# Patient Record
Sex: FEMALE | Race: OTHER | HISPANIC OR LATINO | URBAN - METROPOLITAN AREA
[De-identification: names, ages, dates, MRNs, and addresses within clinical notes are randomized per-mention and may not be internally consistent; named-entity substitution may affect disease eponyms.]

---

## 2023-10-15 ENCOUNTER — INPATIENT (INPATIENT)
Facility: HOSPITAL | Age: 28
LOS: 2 days | Discharge: HOME | DRG: 418 | End: 2023-10-18
Attending: SURGERY | Admitting: SURGERY
Payer: COMMERCIAL

## 2023-10-15 VITALS
SYSTOLIC BLOOD PRESSURE: 131 MMHG | RESPIRATION RATE: 18 BRPM | TEMPERATURE: 98 F | HEART RATE: 68 BPM | OXYGEN SATURATION: 95 % | DIASTOLIC BLOOD PRESSURE: 92 MMHG | WEIGHT: 138.89 LBS

## 2023-10-15 PROCEDURE — 93010 ELECTROCARDIOGRAM REPORT: CPT

## 2023-10-15 PROCEDURE — 99285 EMERGENCY DEPT VISIT HI MDM: CPT

## 2023-10-15 PROCEDURE — 76705 ECHO EXAM OF ABDOMEN: CPT | Mod: 26

## 2023-10-15 PROCEDURE — 99221 1ST HOSP IP/OBS SF/LOW 40: CPT

## 2023-10-15 RX ORDER — MORPHINE SULFATE 50 MG/1
4 CAPSULE, EXTENDED RELEASE ORAL ONCE
Refills: 0 | Status: DISCONTINUED | OUTPATIENT
Start: 2023-10-15 | End: 2023-10-15

## 2023-10-15 RX ORDER — SODIUM CHLORIDE 9 MG/ML
1000 INJECTION, SOLUTION INTRAVENOUS
Refills: 0 | Status: DISCONTINUED | OUTPATIENT
Start: 2023-10-15 | End: 2023-10-17

## 2023-10-15 RX ORDER — HEPARIN SODIUM 5000 [USP'U]/ML
5000 INJECTION INTRAVENOUS; SUBCUTANEOUS EVERY 8 HOURS
Refills: 0 | Status: DISCONTINUED | OUTPATIENT
Start: 2023-10-15 | End: 2023-10-18

## 2023-10-15 RX ORDER — SODIUM CHLORIDE 9 MG/ML
2000 INJECTION INTRAMUSCULAR; INTRAVENOUS; SUBCUTANEOUS ONCE
Refills: 0 | Status: COMPLETED | OUTPATIENT
Start: 2023-10-15 | End: 2023-10-15

## 2023-10-15 RX ORDER — OXYCODONE HYDROCHLORIDE 5 MG/1
5 TABLET ORAL EVERY 6 HOURS
Refills: 0 | Status: DISCONTINUED | OUTPATIENT
Start: 2023-10-15 | End: 2023-10-16

## 2023-10-15 RX ORDER — SODIUM CHLORIDE 9 MG/ML
1000 INJECTION INTRAMUSCULAR; INTRAVENOUS; SUBCUTANEOUS ONCE
Refills: 0 | Status: COMPLETED | OUTPATIENT
Start: 2023-10-15 | End: 2023-10-15

## 2023-10-15 RX ORDER — ONDANSETRON 8 MG/1
4 TABLET, FILM COATED ORAL ONCE
Refills: 0 | Status: COMPLETED | OUTPATIENT
Start: 2023-10-15 | End: 2023-10-15

## 2023-10-15 RX ORDER — ACETAMINOPHEN 500 MG
650 TABLET ORAL EVERY 6 HOURS
Refills: 0 | Status: DISCONTINUED | OUTPATIENT
Start: 2023-10-15 | End: 2023-10-18

## 2023-10-15 RX ORDER — HEPARIN SODIUM 5000 [USP'U]/ML
500 INJECTION INTRAVENOUS; SUBCUTANEOUS EVERY 8 HOURS
Refills: 0 | Status: DISCONTINUED | OUTPATIENT
Start: 2023-10-15 | End: 2023-10-15

## 2023-10-15 RX ORDER — ONDANSETRON 8 MG/1
4 TABLET, FILM COATED ORAL EVERY 6 HOURS
Refills: 0 | Status: DISCONTINUED | OUTPATIENT
Start: 2023-10-15 | End: 2023-10-18

## 2023-10-15 RX ADMIN — SODIUM CHLORIDE 2000 MILLILITER(S): 9 INJECTION INTRAMUSCULAR; INTRAVENOUS; SUBCUTANEOUS at 21:13

## 2023-10-15 RX ADMIN — ONDANSETRON 4 MILLIGRAM(S): 8 TABLET, FILM COATED ORAL at 18:42

## 2023-10-15 RX ADMIN — OXYCODONE HYDROCHLORIDE 5 MILLIGRAM(S): 5 TABLET ORAL at 22:41

## 2023-10-15 RX ADMIN — HEPARIN SODIUM 5000 UNIT(S): 5000 INJECTION INTRAVENOUS; SUBCUTANEOUS at 22:49

## 2023-10-15 RX ADMIN — MORPHINE SULFATE 4 MILLIGRAM(S): 50 CAPSULE, EXTENDED RELEASE ORAL at 21:13

## 2023-10-15 RX ADMIN — SODIUM CHLORIDE 140 MILLILITER(S): 9 INJECTION, SOLUTION INTRAVENOUS at 22:42

## 2023-10-15 RX ADMIN — MORPHINE SULFATE 4 MILLIGRAM(S): 50 CAPSULE, EXTENDED RELEASE ORAL at 18:42

## 2023-10-15 RX ADMIN — SODIUM CHLORIDE 1000 MILLILITER(S): 9 INJECTION INTRAMUSCULAR; INTRAVENOUS; SUBCUTANEOUS at 18:42

## 2023-10-15 RX ADMIN — MORPHINE SULFATE 4 MILLIGRAM(S): 50 CAPSULE, EXTENDED RELEASE ORAL at 21:30

## 2023-10-15 RX ADMIN — MORPHINE SULFATE 4 MILLIGRAM(S): 50 CAPSULE, EXTENDED RELEASE ORAL at 20:53

## 2023-10-15 RX ADMIN — OXYCODONE HYDROCHLORIDE 5 MILLIGRAM(S): 5 TABLET ORAL at 23:41

## 2023-10-15 RX ADMIN — SODIUM CHLORIDE 1000 MILLILITER(S): 9 INJECTION INTRAMUSCULAR; INTRAVENOUS; SUBCUTANEOUS at 20:53

## 2023-10-15 NOTE — H&P ADULT - ATTENDING COMMENTS
Patient seen,a josuée with above. Gallstones pancreatitis.  Today she appears well and is stable. + tenderness epigastrium only. ernie CLD. Patient has updated me that she has previously seen Dr. Javed, she will be transferred to him to continue care of pancreatitis and plan lo.

## 2023-10-15 NOTE — H&P ADULT - NSHPLABSRESULTS_GEN_ALL_CORE
CBC Full  -  ( 15 Oct 2023 18:27 )  WBC Count : 13.09 K/uL  RBC Count : 5.15 M/uL  Hemoglobin : 14.8 g/dL  Hematocrit : 43.9 %  Platelet Count - Automated : 206 K/uL  Mean Cell Volume : 85.2 fl  Mean Cell Hemoglobin : 28.7 pg  Mean Cell Hemoglobin Concentration : 33.7 gm/dL  Auto Neutrophil # : 10.94 K/uL  Auto Lymphocyte # : 1.27 K/uL  Auto Monocyte # : 0.67 K/uL  Auto Eosinophil # : 0.15 K/uL  Auto Basophil # : 0.02 K/uL  Auto Neutrophil % : 83.6 %  Auto Lymphocyte % : 9.7 %  Auto Monocyte % : 5.1 %  Auto Eosinophil % : 1.1 %  Auto Basophil % : 0.2 %    10-15    139  |  103  |  8   ----------------------------<  105<H>  4.1   |  26  |  0.51    Ca    10.2      15 Oct 2023 18:27    TPro  8.1  /  Alb  4.5  /  TBili  0.5  /  DBili  x   /  AST  101<H>  /  ALT  88<H>  /  AlkPhos  112  10-15    LIVER FUNCTIONS - ( 15 Oct 2023 18:27 )  Alb: 4.5 g/dL / Pro: 8.1 g/dL / ALK PHOS: 112 U/L / ALT: 88 U/L / AST: 101 U/L / GGT: x             PT/INR - ( 15 Oct 2023 18:27 )   PT: 11.1 sec;   INR: 0.97          PTT - ( 15 Oct 2023 18:27 )  PTT:43.2 sec    Urinalysis Basic - ( 15 Oct 2023 18:27 )    Color: x / Appearance: x / SG: x / pH: x  Gluc: 105 mg/dL / Ketone: x  / Bili: x / Urobili: x   Blood: x / Protein: x / Nitrite: x   Leuk Esterase: x / RBC: x / WBC x   Sq Epi: x / Non Sq Epi: x / Bacteria: x      RADIOLOGY:  ACC: [id number] EXAM:  US ABDOMEN RT UPR QUADRANT   ORDERED BY: JEAN PAUL BLAIR     *** ADDENDUM # 1 ***    IMPRESSION should also include: Mildly dilated common bile duct.   Nonemergent follow-up with MRCP is suggested.    --- End of Report ---    *** END OF ADDENDUM # 1 ***      PROCEDURE DATE:  10/15/2023          INTERPRETATION:  CLINICAL INFORMATION: Abdominal pain. Concern for   cholecystitis.    COMPARISON: None available.    TECHNIQUE: Sonography of the right upper quadrant.    FINDINGS:  Liver: Enlarged with a right lobe length of 20 cm. Normal parenchymal   echogenicity.  Bile ducts: Mildly dilated common bile duct measures 7 mm.  Gallbladder: Distended. Multiple small mobile stones along the dependent   lumen. No wall thickeningor pericholecystic fluid. Positive sonographic   [place] sign.  Pancreas: Visualized portions are within normal limits.  Right kidney: 11.2 cm. No hydronephrosis.  Ascites: None.  Aorta/IVC: Visualized portions are within normal limits.    IMPRESSION:  Distended gallbladder containing multiple small stones and positive   sonographic [place] sign. Findings are suspicious for acute cholecystitis.

## 2023-10-15 NOTE — ED PROVIDER NOTE - PHYSICAL EXAMINATION
abd: BS+, soft, minimal RUQ ttp, no rebound/guarding. no CVAT  no LE edema, normal equal distal pulses, steady unassisted gait.

## 2023-10-15 NOTE — H&P ADULT - NSHPPHYSICALEXAM_GEN_ALL_CORE
General: Resting in bed, NAD  Neuro: A&Ox3, no focal deficits  CV: NSR  Pulm: Equal chest wall expansion b/l, no respiratory distress  Abdomen: Soft, nondistended, tender to palpation in RUQ and epigastrium. Negative [place]'s signs. No rebound, no guarding. No CVA tenderness. No prior incision noted  Extremities: WWP, No peripheral edema

## 2023-10-15 NOTE — H&P ADULT - ASSESSMENT
28F with no significant PMHx and PSHx of L arm surgery as a child who presents to [facility] from home for worsening RUQ abdominal pain. Patient afebrile and HD stable on presentation. Laboratory findings significant for leukocytosis with L shift and mild transaminitis with elvated lipase c/f pancreatitis. RUQ U/S significant for distended gallbladder with positive sonographic [place]'s sign. Given elevated lipase and gallstones on U/S, likely diagnosis gallstone pancreatitis.     Admit to surgery, regional under    CLD  IVF @ 120  No antbiotics  Pain/nausea control PRN  HSQ/SCDs/IS/OOBA  AM labs with Hepatic functional panel daily  Likely laparoscopic cholecystectomy this admisson  Discussed with chief on call and attending

## 2023-10-15 NOTE — ED PROVIDER NOTE - OBJECTIVE STATEMENT
28F PMH cholelithiasis p/w abd pain. Has intermittent RUQ pain for several months, was diagnosed w/ cholelithiasis during pregnancy, had subsequently followed up w/ Dr. Javed (last visit ~1.5w ago) and had scheduled cholecystectomy for 11/3. However has recurrent similar worsening RUQ pain since ~1100 associated w/ NBNB NV so she came to ED. No other systemic symptoms.   Took tylenol w/o relief.   Denies fevers, chills, diarrhea, black stool, bloody stool, dysuria, hematuria, urinary frequency, focal weakness, focal numbness, lightheadedness, back pain, SOB, CP, rhinorrhea, nasal congestion, sore throat, cough.

## 2023-10-15 NOTE — ED PROVIDER NOTE - PROGRESS NOTE DETAILS
[place]: WBC 13.09, , ALT 88, lipase >3000, other labs grossly wnl. US pending. Surgery consulted. Still in pain but improving. Will give additional IVF/pain meds.  Pending: US, surgery dispo. sandie - received on sign out. workup thus far as above.    US "IMPRESSION:  Distended gallbladder containing multiple small stones and positive   sonographic [place] sign. Findings are suspicious for acute cholecystitis.  IMPRESSION should also include: Mildly dilated common bile duct.   Nonemergent follow-up with MRCP is suggested."    imaging as above. discussed w surg. admit for further management. pt aware and agreeable. abd exam benign at time of admission.

## 2023-10-15 NOTE — ED PROVIDER NOTE - CLINICAL SUMMARY MEDICAL DECISION MAKING FREE TEXT BOX
28F PMH cholelithiasis p/w abd pain. Has intermittent RUQ pain for several months, was diagnosed w/ cholelithiasis during pregnancy, had subsequently followed up w/ Dr. Javed (last visit ~1.5w ago) and had scheduled cholecystectomy for 11/3. However has recurrent similar worsening RUQ pain since ~1100 associated w/ NBNB NV so she came to ED. No other systemic symptoms.   Vitals wnl, exam as above.  ddx: Likely cholelithiasis vs. cholecystitis.   Labs, US, IVF/attempt symptom control, surgery consult (pt already following w/ Dr. Duran Javed).

## 2023-10-15 NOTE — ED ADULT NURSE REASSESSMENT NOTE - NS ED NURSE REASSESS COMMENT FT1
Received report from JANEEN Shelley. Received patient in stretcher. AOX4. Vital signs as noted in flowsheet.  Patient denies chest pain, pain, discomfort, shortness of breath, difficulty breathing and any form of distress not noted. Patient oriented to ED area. Plan of care discussed and verbalized understanding. All needs attended. Purposeful proactive hourly rounding in progress.

## 2023-10-16 LAB
ALBUMIN SERPL ELPH-MCNC: 3.5 G/DL — SIGNIFICANT CHANGE UP (ref 3.3–5)
ALBUMIN SERPL ELPH-MCNC: 3.5 G/DL — SIGNIFICANT CHANGE UP (ref 3.3–5)
ALP SERPL-CCNC: 79 U/L — SIGNIFICANT CHANGE UP (ref 40–120)
ALP SERPL-CCNC: 79 U/L — SIGNIFICANT CHANGE UP (ref 40–120)
ALT FLD-CCNC: 54 U/L — HIGH (ref 10–45)
ALT FLD-CCNC: 54 U/L — HIGH (ref 10–45)
ANION GAP SERPL CALC-SCNC: 9 MMOL/L — SIGNIFICANT CHANGE UP (ref 5–17)
ANION GAP SERPL CALC-SCNC: 9 MMOL/L — SIGNIFICANT CHANGE UP (ref 5–17)
AST SERPL-CCNC: 30 U/L — SIGNIFICANT CHANGE UP (ref 10–40)
AST SERPL-CCNC: 30 U/L — SIGNIFICANT CHANGE UP (ref 10–40)
BILIRUB SERPL-MCNC: 0.4 MG/DL — SIGNIFICANT CHANGE UP (ref 0.2–1.2)
BILIRUB SERPL-MCNC: 0.4 MG/DL — SIGNIFICANT CHANGE UP (ref 0.2–1.2)
BUN SERPL-MCNC: 6 MG/DL — LOW (ref 7–23)
BUN SERPL-MCNC: 6 MG/DL — LOW (ref 7–23)
CALCIUM SERPL-MCNC: 8.9 MG/DL — SIGNIFICANT CHANGE UP (ref 8.4–10.5)
CALCIUM SERPL-MCNC: 8.9 MG/DL — SIGNIFICANT CHANGE UP (ref 8.4–10.5)
CHLORIDE SERPL-SCNC: 106 MMOL/L — SIGNIFICANT CHANGE UP (ref 96–108)
CHLORIDE SERPL-SCNC: 106 MMOL/L — SIGNIFICANT CHANGE UP (ref 96–108)
CO2 SERPL-SCNC: 26 MMOL/L — SIGNIFICANT CHANGE UP (ref 22–31)
CO2 SERPL-SCNC: 26 MMOL/L — SIGNIFICANT CHANGE UP (ref 22–31)
CREAT SERPL-MCNC: 0.57 MG/DL — SIGNIFICANT CHANGE UP (ref 0.5–1.3)
CREAT SERPL-MCNC: 0.57 MG/DL — SIGNIFICANT CHANGE UP (ref 0.5–1.3)
EGFR: 128 ML/MIN/1.73M2 — SIGNIFICANT CHANGE UP
EGFR: 128 ML/MIN/1.73M2 — SIGNIFICANT CHANGE UP
GLUCOSE SERPL-MCNC: 79 MG/DL — SIGNIFICANT CHANGE UP (ref 70–99)
GLUCOSE SERPL-MCNC: 79 MG/DL — SIGNIFICANT CHANGE UP (ref 70–99)
HCT VFR BLD CALC: 37.2 % — SIGNIFICANT CHANGE UP (ref 34.5–45)
HCT VFR BLD CALC: 37.2 % — SIGNIFICANT CHANGE UP (ref 34.5–45)
HGB BLD-MCNC: 12.4 G/DL — SIGNIFICANT CHANGE UP (ref 11.5–15.5)
HGB BLD-MCNC: 12.4 G/DL — SIGNIFICANT CHANGE UP (ref 11.5–15.5)
MAGNESIUM SERPL-MCNC: 1.7 MG/DL — SIGNIFICANT CHANGE UP (ref 1.6–2.6)
MAGNESIUM SERPL-MCNC: 1.7 MG/DL — SIGNIFICANT CHANGE UP (ref 1.6–2.6)
MCHC RBC-ENTMCNC: 28.9 PG — SIGNIFICANT CHANGE UP (ref 27–34)
MCHC RBC-ENTMCNC: 28.9 PG — SIGNIFICANT CHANGE UP (ref 27–34)
MCHC RBC-ENTMCNC: 33.3 GM/DL — SIGNIFICANT CHANGE UP (ref 32–36)
MCHC RBC-ENTMCNC: 33.3 GM/DL — SIGNIFICANT CHANGE UP (ref 32–36)
MCV RBC AUTO: 86.7 FL — SIGNIFICANT CHANGE UP (ref 80–100)
MCV RBC AUTO: 86.7 FL — SIGNIFICANT CHANGE UP (ref 80–100)
NRBC # BLD: 0 /100 WBCS — SIGNIFICANT CHANGE UP (ref 0–0)
NRBC # BLD: 0 /100 WBCS — SIGNIFICANT CHANGE UP (ref 0–0)
PHOSPHATE SERPL-MCNC: 4.6 MG/DL — HIGH (ref 2.5–4.5)
PHOSPHATE SERPL-MCNC: 4.6 MG/DL — HIGH (ref 2.5–4.5)
PLATELET # BLD AUTO: 162 K/UL — SIGNIFICANT CHANGE UP (ref 150–400)
PLATELET # BLD AUTO: 162 K/UL — SIGNIFICANT CHANGE UP (ref 150–400)
POTASSIUM SERPL-MCNC: 3.7 MMOL/L — SIGNIFICANT CHANGE UP (ref 3.5–5.3)
POTASSIUM SERPL-MCNC: 3.7 MMOL/L — SIGNIFICANT CHANGE UP (ref 3.5–5.3)
POTASSIUM SERPL-SCNC: 3.7 MMOL/L — SIGNIFICANT CHANGE UP (ref 3.5–5.3)
POTASSIUM SERPL-SCNC: 3.7 MMOL/L — SIGNIFICANT CHANGE UP (ref 3.5–5.3)
PROT SERPL-MCNC: 6.4 G/DL — SIGNIFICANT CHANGE UP (ref 6–8.3)
PROT SERPL-MCNC: 6.4 G/DL — SIGNIFICANT CHANGE UP (ref 6–8.3)
RBC # BLD: 4.29 M/UL — SIGNIFICANT CHANGE UP (ref 3.8–5.2)
RBC # BLD: 4.29 M/UL — SIGNIFICANT CHANGE UP (ref 3.8–5.2)
RBC # FLD: 13.4 % — SIGNIFICANT CHANGE UP (ref 10.3–14.5)
RBC # FLD: 13.4 % — SIGNIFICANT CHANGE UP (ref 10.3–14.5)
SODIUM SERPL-SCNC: 141 MMOL/L — SIGNIFICANT CHANGE UP (ref 135–145)
SODIUM SERPL-SCNC: 141 MMOL/L — SIGNIFICANT CHANGE UP (ref 135–145)
WBC # BLD: 5.52 K/UL — SIGNIFICANT CHANGE UP (ref 3.8–10.5)
WBC # BLD: 5.52 K/UL — SIGNIFICANT CHANGE UP (ref 3.8–10.5)
WBC # FLD AUTO: 5.52 K/UL — SIGNIFICANT CHANGE UP (ref 3.8–10.5)
WBC # FLD AUTO: 5.52 K/UL — SIGNIFICANT CHANGE UP (ref 3.8–10.5)

## 2023-10-16 PROCEDURE — 99222 1ST HOSP IP/OBS MODERATE 55: CPT

## 2023-10-16 PROCEDURE — 74181 MRI ABDOMEN W/O CONTRAST: CPT | Mod: 26

## 2023-10-16 RX ORDER — METRONIDAZOLE 500 MG
500 TABLET ORAL ONCE
Refills: 0 | Status: COMPLETED | OUTPATIENT
Start: 2023-10-16 | End: 2023-10-16

## 2023-10-16 RX ORDER — METRONIDAZOLE 500 MG
500 TABLET ORAL EVERY 8 HOURS
Refills: 0 | Status: DISCONTINUED | OUTPATIENT
Start: 2023-10-16 | End: 2023-10-18

## 2023-10-16 RX ORDER — POTASSIUM CHLORIDE 20 MEQ
40 PACKET (EA) ORAL ONCE
Refills: 0 | Status: COMPLETED | OUTPATIENT
Start: 2023-10-16 | End: 2023-10-16

## 2023-10-16 RX ORDER — CEFTRIAXONE 500 MG/1
1000 INJECTION, POWDER, FOR SOLUTION INTRAMUSCULAR; INTRAVENOUS EVERY 24 HOURS
Refills: 0 | Status: DISCONTINUED | OUTPATIENT
Start: 2023-10-16 | End: 2023-10-18

## 2023-10-16 RX ORDER — MAGNESIUM SULFATE 500 MG/ML
1 VIAL (ML) INJECTION ONCE
Refills: 0 | Status: COMPLETED | OUTPATIENT
Start: 2023-10-16 | End: 2023-10-16

## 2023-10-16 RX ORDER — OXYCODONE HYDROCHLORIDE 5 MG/1
5 TABLET ORAL EVERY 6 HOURS
Refills: 0 | Status: DISCONTINUED | OUTPATIENT
Start: 2023-10-16 | End: 2023-10-18

## 2023-10-16 RX ORDER — INFLUENZA VIRUS VACCINE 15; 15; 15; 15 UG/.5ML; UG/.5ML; UG/.5ML; UG/.5ML
0.5 SUSPENSION INTRAMUSCULAR ONCE
Refills: 0 | Status: DISCONTINUED | OUTPATIENT
Start: 2023-10-16 | End: 2023-10-18

## 2023-10-16 RX ORDER — METRONIDAZOLE 500 MG
TABLET ORAL
Refills: 0 | Status: DISCONTINUED | OUTPATIENT
Start: 2023-10-16 | End: 2023-10-18

## 2023-10-16 RX ADMIN — Medication 650 MILLIGRAM(S): at 14:55

## 2023-10-16 RX ADMIN — CEFTRIAXONE 100 MILLIGRAM(S): 500 INJECTION, POWDER, FOR SOLUTION INTRAMUSCULAR; INTRAVENOUS at 16:59

## 2023-10-16 RX ADMIN — OXYCODONE HYDROCHLORIDE 5 MILLIGRAM(S): 5 TABLET ORAL at 17:02

## 2023-10-16 RX ADMIN — OXYCODONE HYDROCHLORIDE 5 MILLIGRAM(S): 5 TABLET ORAL at 04:25

## 2023-10-16 RX ADMIN — OXYCODONE HYDROCHLORIDE 5 MILLIGRAM(S): 5 TABLET ORAL at 05:25

## 2023-10-16 RX ADMIN — Medication 100 MILLIGRAM(S): at 17:40

## 2023-10-16 RX ADMIN — Medication 650 MILLIGRAM(S): at 13:55

## 2023-10-16 RX ADMIN — HEPARIN SODIUM 5000 UNIT(S): 5000 INJECTION INTRAVENOUS; SUBCUTANEOUS at 05:57

## 2023-10-16 RX ADMIN — Medication 40 MILLIEQUIVALENT(S): at 18:50

## 2023-10-16 RX ADMIN — Medication 100 GRAM(S): at 18:50

## 2023-10-16 RX ADMIN — HEPARIN SODIUM 5000 UNIT(S): 5000 INJECTION INTRAVENOUS; SUBCUTANEOUS at 13:55

## 2023-10-16 RX ADMIN — SODIUM CHLORIDE 140 MILLILITER(S): 9 INJECTION, SOLUTION INTRAVENOUS at 13:55

## 2023-10-16 NOTE — CONSULT NOTE ADULT - ASSESSMENT
29 yo F with no significant PMHx and PSHx of L arm surgery as a child who presents to [facility] from home for  RUQ abdominal pain, pt was scheduled for elective procedure in November but presented to ed for  worsening RUQ abdominal pain, .  Pt admitted for gallstone pancreatitis and acute cholecystitis     gallstone pancreatitis  acute cholecystitis   transaminitis   RUQ  W Distended gallbladder containing multiple small stones and positive   sonographic [place] sign. Findings are suspicious for acute cholecystitis.  Lipase > 3k   wbc  >13k   agree w IVF and CLD   lft'S downtrending AST/ALD  101/88  plan for CCY on this admission after recovery of pancrease     dvt ppx hsq

## 2023-10-16 NOTE — PROGRESS NOTE ADULT - SUBJECTIVE AND OBJECTIVE BOX
SUBJECTIVE: Patient seen and examined bedside. Had 4 episodes of vomiting this morning but feeling better now. No flatus/BM.    heparin   Injectable 5000 Unit(s) SubCutaneous every 8 hours      Vital Signs Last 24 Hrs  T(C): 36.8 (16 Oct 2023 04:15), Max: 36.8 (15 Oct 2023 22:35)  T(F): 98.3 (16 Oct 2023 04:15), Max: 98.3 (15 Oct 2023 22:35)  HR: 66 (16 Oct 2023 04:15) (65 - 77)  BP: 117/73 (16 Oct 2023 04:15) (109/72 - 143/90)  BP(mean): --  RR: 17 (16 Oct 2023 04:15) (17 - 18)  SpO2: 96% (16 Oct 2023 04:15) (95% - 98%)    Parameters below as of 16 Oct 2023 04:15  Patient On (Oxygen Delivery Method): room air      I&O's Detail    15 Oct 2023 07:01  -  16 Oct 2023 07:00  --------------------------------------------------------  IN:    Lactated Ringers: 1400 mL  Total IN: 1400 mL    OUT:    Voided (mL): 350 mL  Total OUT: 350 mL    Total NET: 1050 mL        General: Resting in bed, NAD  Neuro: A&Ox3, no focal deficits  CV: NSR  Pulm: Equal chest wall expansion b/l, no respiratory distress  Abdomen: Soft, nondistended, tender to palpation in RUQ and epigastrium. Negative [place]'s signs. No rebound, no guarding. No CVA tenderness. No prior incision noted  Extremities: WWP, No peripheral edema        LABS:                        14.8   13.09 )-----------( 206      ( 15 Oct 2023 18:27 )             43.9     10-15    139  |  103  |  8   ----------------------------<  105<H>  4.1   |  26  |  0.51    Ca    10.2      15 Oct 2023 18:27    TPro  8.1  /  Alb  4.5  /  TBili  0.5  /  DBili  x   /  AST  101<H>  /  ALT  88<H>  /  AlkPhos  112  10-15    PT/INR - ( 15 Oct 2023 18:27 )   PT: 11.1 sec;   INR: 0.97          PTT - ( 15 Oct 2023 18:27 )  PTT:43.2 sec  Urinalysis Basic - ( 15 Oct 2023 18:27 )    Color: x / Appearance: x / SG: x / pH: x  Gluc: 105 mg/dL / Ketone: x  / Bili: x / Urobili: x   Blood: x / Protein: x / Nitrite: x   Leuk Esterase: x / RBC: x / WBC x   Sq Epi: x / Non Sq Epi: x / Bacteria: x        RADIOLOGY & ADDITIONAL STUDIES:

## 2023-10-16 NOTE — PATIENT PROFILE ADULT - FALL HARM RISK - UNIVERSAL INTERVENTIONS
Bed in lowest position, wheels locked, appropriate side rails in place/Call bell, personal items and telephone in reach/Instruct patient to call for assistance before getting out of bed or chair/Non-slip footwear when patient is out of bed/[place] to call system/Physically safe environment - no spills, clutter or unnecessary equipment/Purposeful Proactive Rounding/Room/bathroom lighting operational, light cord in reach

## 2023-10-16 NOTE — CONSULT NOTE ADULT - SUBJECTIVE AND OBJECTIVE BOX
Patient is a 28y old  Female who presents with a chief complaint of abdominal pain (16 Oct 2023 07:12)      HPI:  Patient is a 28F with no significant PMHx and PSHx of L arm surgery as a child who presents to [facility] from home for worsening RUQ abdominal pain. Patient reports she has seen Dr. Javed in the past for evaluation for RUQ pain. States she first developed cholelithiasis during pregnancy over the summer where she started to develop biliary colic symptoms of post prandial RUQ pain that was relieved after an hour or so. States episodes at that time were infrequent, but over the past 2 weeks she has been having 1-2 episodes per week of sharp, post prandial RUQ pain that radiates to the back. States the pain is 10/10 at its worst and not relieved by Tylenol. States she developed another attack this AM after a breakfast of an egg sandwich and ice cream at which point she developed sharp, stabby RUQ pain. States pain was associated with nausea and 1 episode of nonbilious, nonbloody emesis. Deneis fevers or chills. States she hasn't been able to tolerate PO today secondary to pain, but is hungry currently.    Denies family history of cholelithiasis or malginancy of galbladder, biliary tree or pancreas.    Medical History: Cholelithiasis  Surgical History: R arm surgery  Medications: Denies  Allergies: NKDA  Social History: Denies tobacco use. Reports she uses alcohol socially. Denies other drug use. States she works as a .    In the ED, patient afebrile:   - VITALS: T 98F, HR 77, /90, saturating well on RA  - LABORATORY: WBC 13K with neutrophil predominence, Lipase >300, mild transaminitis with  and T bili 0.5  - IMAGING: RUQ U/S performed in the ED that revealed distended gallbladder with multiple stones. No PCCF or GBW, but positive sonographic [place]'s sign concerning for acute cholecystitis. CBD mildly dilated at 7 mm (15 Oct 2023 21:44)      Allergies    No Known Allergies    Intolerances        MEDICATIONS  (STANDING):  heparin   Injectable 5000 Unit(s) SubCutaneous every 8 hours  influenza   Vaccine 0.5 milliLiter(s) IntraMuscular once  lactated ringers. 1000 milliLiter(s) (140 mL/Hr) IV Continuous <Continuous>    MEDICATIONS  (PRN):  acetaminophen   Oral Liquid .. 650 milliGRAM(s) Oral every 6 hours PRN Mild Pain (1 - 3), Moderate Pain (4 - 6)  ondansetron Injectable 4 milliGRAM(s) IV Push every 6 hours PRN Nausea  oxyCODONE    IR 5 milliGRAM(s) Oral every 6 hours PRN Severe Pain (7 - 10)      Daily Height in cm: 157.48 (15 Oct 2023 22:08)    Daily     Drug Dosing Weight  Height (cm): 157.5 (15 Oct 2023 22:08)  Weight (kg): 63 (15 Oct 2023 17:27)  BMI (kg/m2): 25.4 (15 Oct 2023 22:08)  BSA (m2): 1.64 (15 Oct 2023 22:08)    PAST MEDICAL & SURGICAL HISTORY:  No pertinent past medical history          FAMILY HISTORY:        REVIEW OF SYSTEMS:    CONSTITUTIONAL: No fever, weight loss, or fatigue  EYES: No eye pain, visual disturbances, or discharge  ENMT:  No difficulty hearing, tinnitus, vertigo; No sinus or throat pain  NECK: No pain or stiffness  RESPIRATORY: No cough, wheezing, chills or hemoptysis; No shortness of breath  CARDIOVASCULAR: No chest pain, palpitations, dizziness, or leg swelling  GASTROINTESTINAL:  + abdominal / epigastric pain. No nausea, vomiting, or hematemesis;    GENITOURINARY: No dysuria, frequency, hematuria, or incontinence  LYMPH NODES: No enlarged glands  ENDOCRINE: No heat or cold intolerance; No hair loss  MUSCULOSKELETAL: No joint pain or swelling; No muscle, back, or extremity pain  NEUROLOGICAL: No headaches, memory loss, loss of strength, numbness, or tremors  SKIN: No itching, burning, rashes, or lesion               Vital Signs Last 24 Hrs  T(C): 36.9 (16 Oct 2023 08:33), Max: 36.9 (16 Oct 2023 08:33)  T(F): 98.5 (16 Oct 2023 08:33), Max: 98.5 (16 Oct 2023 08:33)  HR: 71 (16 Oct 2023 08:33) (65 - 77)  BP: 108/71 (16 Oct 2023 08:33) (108/71 - 143/90)  BP(mean): --  ABP: --  ABP(mean): --  RR: 17 (16 Oct 2023 08:33) (17 - 18)  SpO2: 98% (16 Oct 2023 08:33) (95% - 98%)    O2 Parameters below as of 16 Oct 2023 08:33  Patient On (Oxygen Delivery Method): room air                I&O's Detail    15 Oct 2023 07:01  -  16 Oct 2023 07:00  --------------------------------------------------------  IN:    Lactated Ringers: 1400 mL  Total IN: 1400 mL    OUT:    Voided (mL): 350 mL  Total OUT: 350 mL    Total NET: 1050 mL      16 Oct 2023 07:01  -  16 Oct 2023 12:19  --------------------------------------------------------  IN:    Lactated Ringers: 700 mL    Oral Fluid: 320 mL  Total IN: 1020 mL    OUT:    Voided (mL): 400 mL  Total OUT: 400 mL    Total NET: 620 mL          PHYSICAL EXAM:    GENERAL: NAD, well-groomed, well-developed  HEAD:  Atraumatic, Normocephalic  EYES: EOMI, PERRLA, conjunctiva and sclera clear  ENMT: No tonsillar erythema, exudates, or enlargement; Moist mucous membranes, Good dentition, No lesions  NECK: Supple, No JVD, Normal thyroid  NERVOUS SYSTEM:  Alert & Oriented X3, Good concentration; Motor Strength 5/5 B/L upper and lower extremities; DTRs 2+ intact and symmetric  CHEST/LUNG: Clear to percussion bilaterally; No rales, rhonchi, wheezing, or rubs  HEART: Regular rate and rhythm; No murmurs, rubs, or gallops  ABDOMEN: Soft, ttp in epigastric region , Nondistended; Bowel sounds present  EXTREMITIES:  2+ Peripheral Pulses, No clubbing, cyanosis, or edema  LYMPH: No lymphadenopathy noted  SKIN: No rashes or lesions    LABS:  CBC Full  -  ( 15 Oct 2023 18:27 )  WBC Count : 13.09 K/uL  RBC Count : 5.15 M/uL  Hemoglobin : 14.8 g/dL  Hematocrit : 43.9 %  Platelet Count - Automated : 206 K/uL  Mean Cell Volume : 85.2 fl  Mean Cell Hemoglobin : 28.7 pg  Mean Cell Hemoglobin Concentration : 33.7 gm/dL  Auto Neutrophil # : 10.94 K/uL  Auto Lymphocyte # : 1.27 K/uL  Auto Monocyte # : 0.67 K/uL  Auto Eosinophil # : 0.15 K/uL  Auto Basophil # : 0.02 K/uL  Auto Neutrophil % : 83.6 %  Auto Lymphocyte % : 9.7 %  Auto Monocyte % : 5.1 %  Auto Eosinophil % : 1.1 %  Auto Basophil % : 0.2 %    10-15    139  |  103  |  8   ----------------------------<  105<H>  4.1   |  26  |  0.51    Ca    10.2      15 Oct 2023 18:27    TPro  8.1  /  Alb  4.5  /  TBili  0.5  /  DBili  x   /  AST  101<H>  /  ALT  88<H>  /  AlkPhos  112  10-15    CAPILLARY BLOOD GLUCOSE        PT/INR - ( 15 Oct 2023 18:27 )   PT: 11.1 sec;   INR: 0.97          PTT - ( 15 Oct 2023 18:27 )  PTT:43.2 sec  Urinalysis Basic - ( 15 Oct 2023 18:27 )    Color: x / Appearance: x / SG: x / pH: x  Gluc: 105 mg/dL / Ketone: x  / Bili: x / Urobili: x   Blood: x / Protein: x / Nitrite: x   Leuk Esterase: x / RBC: x / WBC x   Sq Epi: x / Non Sq Epi: x / Bacteria: x              EKG:    ECHO, US:    RADIOLOGY:  < from: US Abdomen Upper Quadrant Right (10.15.23 @ 19:55) >  IMPRESSION:  Distended gallbladder containing multiple small stones and positive   sonographic [place] sign. Findings are suspicious for acute cholecystitis.    < end of copied text >

## 2023-10-17 LAB
ALBUMIN SERPL ELPH-MCNC: 4 G/DL — SIGNIFICANT CHANGE UP (ref 3.3–5)
ALBUMIN SERPL ELPH-MCNC: 4 G/DL — SIGNIFICANT CHANGE UP (ref 3.3–5)
ALP SERPL-CCNC: 83 U/L — SIGNIFICANT CHANGE UP (ref 40–120)
ALP SERPL-CCNC: 83 U/L — SIGNIFICANT CHANGE UP (ref 40–120)
ALT FLD-CCNC: 55 U/L — HIGH (ref 10–45)
ALT FLD-CCNC: 55 U/L — HIGH (ref 10–45)
ANION GAP SERPL CALC-SCNC: 8 MMOL/L — SIGNIFICANT CHANGE UP (ref 5–17)
ANION GAP SERPL CALC-SCNC: 8 MMOL/L — SIGNIFICANT CHANGE UP (ref 5–17)
AST SERPL-CCNC: 32 U/L — SIGNIFICANT CHANGE UP (ref 10–40)
AST SERPL-CCNC: 32 U/L — SIGNIFICANT CHANGE UP (ref 10–40)
BILIRUB SERPL-MCNC: 0.4 MG/DL — SIGNIFICANT CHANGE UP (ref 0.2–1.2)
BILIRUB SERPL-MCNC: 0.4 MG/DL — SIGNIFICANT CHANGE UP (ref 0.2–1.2)
BUN SERPL-MCNC: 5 MG/DL — LOW (ref 7–23)
BUN SERPL-MCNC: 5 MG/DL — LOW (ref 7–23)
CALCIUM SERPL-MCNC: 9.6 MG/DL — SIGNIFICANT CHANGE UP (ref 8.4–10.5)
CALCIUM SERPL-MCNC: 9.6 MG/DL — SIGNIFICANT CHANGE UP (ref 8.4–10.5)
CHLORIDE SERPL-SCNC: 101 MMOL/L — SIGNIFICANT CHANGE UP (ref 96–108)
CHLORIDE SERPL-SCNC: 101 MMOL/L — SIGNIFICANT CHANGE UP (ref 96–108)
CO2 SERPL-SCNC: 26 MMOL/L — SIGNIFICANT CHANGE UP (ref 22–31)
CO2 SERPL-SCNC: 26 MMOL/L — SIGNIFICANT CHANGE UP (ref 22–31)
CREAT SERPL-MCNC: 0.53 MG/DL — SIGNIFICANT CHANGE UP (ref 0.5–1.3)
CREAT SERPL-MCNC: 0.53 MG/DL — SIGNIFICANT CHANGE UP (ref 0.5–1.3)
EGFR: 130 ML/MIN/1.73M2 — SIGNIFICANT CHANGE UP
EGFR: 130 ML/MIN/1.73M2 — SIGNIFICANT CHANGE UP
GLUCOSE SERPL-MCNC: 85 MG/DL — SIGNIFICANT CHANGE UP (ref 70–99)
GLUCOSE SERPL-MCNC: 85 MG/DL — SIGNIFICANT CHANGE UP (ref 70–99)
HCT VFR BLD CALC: 40.4 % — SIGNIFICANT CHANGE UP (ref 34.5–45)
HCT VFR BLD CALC: 40.4 % — SIGNIFICANT CHANGE UP (ref 34.5–45)
HGB BLD-MCNC: 13.7 G/DL — SIGNIFICANT CHANGE UP (ref 11.5–15.5)
HGB BLD-MCNC: 13.7 G/DL — SIGNIFICANT CHANGE UP (ref 11.5–15.5)
MAGNESIUM SERPL-MCNC: 2 MG/DL — SIGNIFICANT CHANGE UP (ref 1.6–2.6)
MAGNESIUM SERPL-MCNC: 2 MG/DL — SIGNIFICANT CHANGE UP (ref 1.6–2.6)
MCHC RBC-ENTMCNC: 28.8 PG — SIGNIFICANT CHANGE UP (ref 27–34)
MCHC RBC-ENTMCNC: 28.8 PG — SIGNIFICANT CHANGE UP (ref 27–34)
MCHC RBC-ENTMCNC: 33.9 GM/DL — SIGNIFICANT CHANGE UP (ref 32–36)
MCHC RBC-ENTMCNC: 33.9 GM/DL — SIGNIFICANT CHANGE UP (ref 32–36)
MCV RBC AUTO: 85.1 FL — SIGNIFICANT CHANGE UP (ref 80–100)
MCV RBC AUTO: 85.1 FL — SIGNIFICANT CHANGE UP (ref 80–100)
NRBC # BLD: 0 /100 WBCS — SIGNIFICANT CHANGE UP (ref 0–0)
NRBC # BLD: 0 /100 WBCS — SIGNIFICANT CHANGE UP (ref 0–0)
PHOSPHATE SERPL-MCNC: 5.2 MG/DL — HIGH (ref 2.5–4.5)
PHOSPHATE SERPL-MCNC: 5.2 MG/DL — HIGH (ref 2.5–4.5)
PLATELET # BLD AUTO: 178 K/UL — SIGNIFICANT CHANGE UP (ref 150–400)
PLATELET # BLD AUTO: 178 K/UL — SIGNIFICANT CHANGE UP (ref 150–400)
POTASSIUM SERPL-MCNC: 3.9 MMOL/L — SIGNIFICANT CHANGE UP (ref 3.5–5.3)
POTASSIUM SERPL-MCNC: 3.9 MMOL/L — SIGNIFICANT CHANGE UP (ref 3.5–5.3)
POTASSIUM SERPL-SCNC: 3.9 MMOL/L — SIGNIFICANT CHANGE UP (ref 3.5–5.3)
POTASSIUM SERPL-SCNC: 3.9 MMOL/L — SIGNIFICANT CHANGE UP (ref 3.5–5.3)
PROT SERPL-MCNC: 7.4 G/DL — SIGNIFICANT CHANGE UP (ref 6–8.3)
PROT SERPL-MCNC: 7.4 G/DL — SIGNIFICANT CHANGE UP (ref 6–8.3)
RBC # BLD: 4.75 M/UL — SIGNIFICANT CHANGE UP (ref 3.8–5.2)
RBC # BLD: 4.75 M/UL — SIGNIFICANT CHANGE UP (ref 3.8–5.2)
RBC # FLD: 13.2 % — SIGNIFICANT CHANGE UP (ref 10.3–14.5)
RBC # FLD: 13.2 % — SIGNIFICANT CHANGE UP (ref 10.3–14.5)
SODIUM SERPL-SCNC: 135 MMOL/L — SIGNIFICANT CHANGE UP (ref 135–145)
SODIUM SERPL-SCNC: 135 MMOL/L — SIGNIFICANT CHANGE UP (ref 135–145)
WBC # BLD: 6.47 K/UL — SIGNIFICANT CHANGE UP (ref 3.8–10.5)
WBC # BLD: 6.47 K/UL — SIGNIFICANT CHANGE UP (ref 3.8–10.5)
WBC # FLD AUTO: 6.47 K/UL — SIGNIFICANT CHANGE UP (ref 3.8–10.5)
WBC # FLD AUTO: 6.47 K/UL — SIGNIFICANT CHANGE UP (ref 3.8–10.5)

## 2023-10-17 PROCEDURE — 99233 SBSQ HOSP IP/OBS HIGH 50: CPT

## 2023-10-17 RX ORDER — SODIUM CHLORIDE 9 MG/ML
1000 INJECTION, SOLUTION INTRAVENOUS
Refills: 0 | Status: DISCONTINUED | OUTPATIENT
Start: 2023-10-17 | End: 2023-10-17

## 2023-10-17 RX ORDER — SODIUM CHLORIDE 9 MG/ML
1000 INJECTION, SOLUTION INTRAVENOUS
Refills: 0 | Status: DISCONTINUED | OUTPATIENT
Start: 2023-10-17 | End: 2023-10-18

## 2023-10-17 RX ADMIN — ONDANSETRON 4 MILLIGRAM(S): 8 TABLET, FILM COATED ORAL at 00:07

## 2023-10-17 RX ADMIN — SODIUM CHLORIDE 100 MILLILITER(S): 9 INJECTION, SOLUTION INTRAVENOUS at 16:43

## 2023-10-17 RX ADMIN — Medication 100 MILLIGRAM(S): at 00:07

## 2023-10-17 RX ADMIN — HEPARIN SODIUM 5000 UNIT(S): 5000 INJECTION INTRAVENOUS; SUBCUTANEOUS at 00:08

## 2023-10-17 RX ADMIN — OXYCODONE HYDROCHLORIDE 5 MILLIGRAM(S): 5 TABLET ORAL at 07:24

## 2023-10-17 RX ADMIN — HEPARIN SODIUM 5000 UNIT(S): 5000 INJECTION INTRAVENOUS; SUBCUTANEOUS at 15:00

## 2023-10-17 RX ADMIN — Medication 100 MILLIGRAM(S): at 23:10

## 2023-10-17 RX ADMIN — Medication 650 MILLIGRAM(S): at 17:52

## 2023-10-17 RX ADMIN — SODIUM CHLORIDE 100 MILLILITER(S): 9 INJECTION, SOLUTION INTRAVENOUS at 07:07

## 2023-10-17 RX ADMIN — HEPARIN SODIUM 5000 UNIT(S): 5000 INJECTION INTRAVENOUS; SUBCUTANEOUS at 23:10

## 2023-10-17 RX ADMIN — ONDANSETRON 4 MILLIGRAM(S): 8 TABLET, FILM COATED ORAL at 20:40

## 2023-10-17 RX ADMIN — CEFTRIAXONE 100 MILLIGRAM(S): 500 INJECTION, POWDER, FOR SOLUTION INTRAMUSCULAR; INTRAVENOUS at 16:43

## 2023-10-17 RX ADMIN — Medication 100 MILLIGRAM(S): at 07:07

## 2023-10-17 RX ADMIN — HEPARIN SODIUM 5000 UNIT(S): 5000 INJECTION INTRAVENOUS; SUBCUTANEOUS at 07:08

## 2023-10-17 RX ADMIN — Medication 650 MILLIGRAM(S): at 16:52

## 2023-10-17 RX ADMIN — Medication 100 MILLIGRAM(S): at 15:00

## 2023-10-17 NOTE — PROGRESS NOTE ADULT - SUBJECTIVE AND OBJECTIVE BOX
SUBJECTIVE: Patient seen and examined bedside by chief resident. some pain, but less than when she was admitted. understands plan for OR today     cefTRIAXone   IVPB 1000 milliGRAM(s) IV Intermittent every 24 hours  heparin   Injectable 5000 Unit(s) SubCutaneous every 8 hours  metroNIDAZOLE  IVPB 500 milliGRAM(s) IV Intermittent every 8 hours  metroNIDAZOLE  IVPB          Vital Signs Last 24 Hrs  T(C): 36.9 (17 Oct 2023 04:16), Max: 36.9 (16 Oct 2023 08:33)  T(F): 98.4 (17 Oct 2023 04:16), Max: 98.5 (16 Oct 2023 08:33)  HR: 82 (17 Oct 2023 04:16) (64 - 82)  BP: 124/77 (17 Oct 2023 04:16) (108/71 - 124/77)  BP(mean): 93 (17 Oct 2023 04:16) (93 - 94)  RR: 17 (17 Oct 2023 04:16) (17 - 18)  SpO2: 96% (17 Oct 2023 04:16) (96% - 98%)    Parameters below as of 17 Oct 2023 04:16  Patient On (Oxygen Delivery Method): room air      I&O's Detail    16 Oct 2023 07:01  -  17 Oct 2023 07:00  --------------------------------------------------------  IN:    Lactated Ringers: 2660 mL    Oral Fluid: 620 mL  Total IN: 3280 mL    OUT:    Voided (mL): 2400 mL  Total OUT: 2400 mL    Total NET: 880 mL          General: NAD, resting comfortably in bed  C/V: NSR  Pulm: Nonlabored breathing, no respiratory distress  Abd: soft, ND, RUQ TTP   Extrem: WWP, no edema, SCDs in place        LABS:                        12.4   5.52  )-----------( 162      ( 16 Oct 2023 17:11 )             37.2     10-16    141  |  106  |  6<L>  ----------------------------<  79  3.7   |  26  |  0.57    Ca    8.9      16 Oct 2023 17:11  Phos  4.6     10-16  Mg     1.7     10-16    TPro  6.4  /  Alb  3.5  /  TBili  0.4  /  DBili  x   /  AST  30  /  ALT  54<H>  /  AlkPhos  79  10-16    PT/INR - ( 15 Oct 2023 18:27 )   PT: 11.1 sec;   INR: 0.97          PTT - ( 15 Oct 2023 18:27 )  PTT:43.2 sec  Urinalysis Basic - ( 16 Oct 2023 17:11 )    Color: x / Appearance: x / SG: x / pH: x  Gluc: 79 mg/dL / Ketone: x  / Bili: x / Urobili: x   Blood: x / Protein: x / Nitrite: x   Leuk Esterase: x / RBC: x / WBC x   Sq Epi: x / Non Sq Epi: x / Bacteria: x        RADIOLOGY & ADDITIONAL STUDIES:   SUBJECTIVE: Patient seen and examined bedside by chief resident. some pain, but less than when she was admitted. understands plan for OR tomorrow    cefTRIAXone   IVPB 1000 milliGRAM(s) IV Intermittent every 24 hours  heparin   Injectable 5000 Unit(s) SubCutaneous every 8 hours  metroNIDAZOLE  IVPB 500 milliGRAM(s) IV Intermittent every 8 hours  metroNIDAZOLE  IVPB          Vital Signs Last 24 Hrs  T(C): 36.9 (17 Oct 2023 04:16), Max: 36.9 (16 Oct 2023 08:33)  T(F): 98.4 (17 Oct 2023 04:16), Max: 98.5 (16 Oct 2023 08:33)  HR: 82 (17 Oct 2023 04:16) (64 - 82)  BP: 124/77 (17 Oct 2023 04:16) (108/71 - 124/77)  BP(mean): 93 (17 Oct 2023 04:16) (93 - 94)  RR: 17 (17 Oct 2023 04:16) (17 - 18)  SpO2: 96% (17 Oct 2023 04:16) (96% - 98%)    Parameters below as of 17 Oct 2023 04:16  Patient On (Oxygen Delivery Method): room air      I&O's Detail    16 Oct 2023 07:01  -  17 Oct 2023 07:00  --------------------------------------------------------  IN:    Lactated Ringers: 2660 mL    Oral Fluid: 620 mL  Total IN: 3280 mL    OUT:    Voided (mL): 2400 mL  Total OUT: 2400 mL    Total NET: 880 mL          General: NAD, resting comfortably in bed  C/V: NSR  Pulm: Nonlabored breathing, no respiratory distress  Abd: soft, ND, RUQ TTP   Extrem: WWP, no edema, SCDs in place        LABS:                        12.4   5.52  )-----------( 162      ( 16 Oct 2023 17:11 )             37.2     10-16    141  |  106  |  6<L>  ----------------------------<  79  3.7   |  26  |  0.57    Ca    8.9      16 Oct 2023 17:11  Phos  4.6     10-16  Mg     1.7     10-16    TPro  6.4  /  Alb  3.5  /  TBili  0.4  /  DBili  x   /  AST  30  /  ALT  54<H>  /  AlkPhos  79  10-16    PT/INR - ( 15 Oct 2023 18:27 )   PT: 11.1 sec;   INR: 0.97          PTT - ( 15 Oct 2023 18:27 )  PTT:43.2 sec  Urinalysis Basic - ( 16 Oct 2023 17:11 )    Color: x / Appearance: x / SG: x / pH: x  Gluc: 79 mg/dL / Ketone: x  / Bili: x / Urobili: x   Blood: x / Protein: x / Nitrite: x   Leuk Esterase: x / RBC: x / WBC x   Sq Epi: x / Non Sq Epi: x / Bacteria: x        RADIOLOGY & ADDITIONAL STUDIES:

## 2023-10-17 NOTE — PROGRESS NOTE ADULT - SUBJECTIVE AND OBJECTIVE BOX
Patient is a 28y old  Female who presents with a chief complaint of abdominal pain (17 Oct 2023 07:04)      INTERVAL HPI/OVERNIGHT EVENTS: offers no new complaints; current symptoms resolving    MEDICATIONS  (STANDING):  cefTRIAXone   IVPB 1000 milliGRAM(s) IV Intermittent every 24 hours  heparin   Injectable 5000 Unit(s) SubCutaneous every 8 hours  influenza   Vaccine 0.5 milliLiter(s) IntraMuscular once  lactated ringers. 1000 milliLiter(s) (100 mL/Hr) IV Continuous <Continuous>  metroNIDAZOLE  IVPB      metroNIDAZOLE  IVPB 500 milliGRAM(s) IV Intermittent every 8 hours    MEDICATIONS  (PRN):  acetaminophen   Oral Liquid .. 650 milliGRAM(s) Oral every 6 hours PRN Mild Pain (1 - 3), Moderate Pain (4 - 6)  ondansetron Injectable 4 milliGRAM(s) IV Push every 6 hours PRN Nausea  oxyCODONE    IR 5 milliGRAM(s) Oral every 6 hours PRN Severe Pain (7 - 10)      __________________________________________________  REVIEW OF SYSTEMS:    CONSTITUTIONAL: No fever,   EYES: no acute visual disturbances  NECK: No pain or stiffness  RESPIRATORY: No cough; No shortness of breath  CARDIOVASCULAR: No chest pain, no palpitations  GASTROINTESTINAL: No pain. No nausea or vomiting; No diarrhea   NEUROLOGICAL: No headache or numbness, no tremors  MUSCULOSKELETAL: No joint pain, no muscle pain  GENITOURINARY: no dysuria, no frequency, no hesitancy  PSYCHIATRY: no depression , no anxiety  ALL OTHER  ROS negative        Vital Signs Last 24 Hrs  T(C): 36.7 (17 Oct 2023 08:25), Max: 36.9 (16 Oct 2023 23:57)  T(F): 98.1 (17 Oct 2023 08:25), Max: 98.5 (16 Oct 2023 23:57)  HR: 69 (17 Oct 2023 08:25) (64 - 82)  BP: 123/78 (17 Oct 2023 08:25) (114/74 - 124/77)  BP(mean): 93 (17 Oct 2023 04:16) (93 - 94)  RR: 18 (17 Oct 2023 08:25) (17 - 18)  SpO2: 95% (17 Oct 2023 08:25) (95% - 97%)    Parameters below as of 17 Oct 2023 08:25  Patient On (Oxygen Delivery Method): room air        ________________________________________________  PHYSICAL EXAM:  GENERAL: NAD  HEENT: Normocephalic;  conjunctivae and sclerae clear; moist mucous membranes;   NECK : supple  CHEST/LUNG: Clear to auscultation bilaterally with good air entry   HEART: S1 S2  regular; no murmurs, gallops or rubs  ABDOMEN: Soft, Nontender, Nondistended; Bowel sounds present  EXTREMITIES: no cyanosis; no edema; no calf tenderness  SKIN: warm and dry; no rash  NERVOUS SYSTEM:  Awake and alert; Oriented  to place, person and time ; no new deficits    _________________________________________________  LABS:                        13.7   6.47  )-----------( 178      ( 17 Oct 2023 05:30 )             40.4     10-17    135  |  101  |  5<L>  ----------------------------<  85  3.9   |  26  |  0.53    Ca    9.6      17 Oct 2023 05:30  Phos  5.2     10-17  Mg     2.0     10-17    TPro  7.4  /  Alb  4.0  /  TBili  0.4  /  DBili  x   /  AST  32  /  ALT  55<H>  /  AlkPhos  83  10-17    PT/INR - ( 15 Oct 2023 18:27 )   PT: 11.1 sec;   INR: 0.97          PTT - ( 15 Oct 2023 18:27 )  PTT:43.2 sec  Urinalysis Basic - ( 17 Oct 2023 05:30 )    Color: x / Appearance: x / SG: x / pH: x  Gluc: 85 mg/dL / Ketone: x  / Bili: x / Urobili: x   Blood: x / Protein: x / Nitrite: x   Leuk Esterase: x / RBC: x / WBC x   Sq Epi: x / Non Sq Epi: x / Bacteria: x      CAPILLARY BLOOD GLUCOSE            RADIOLOGY & ADDITIONAL TESTS:      Plan of care was discussed with patient and /or primary care giver; all questions and concerns were addressed and care was aligned with patient's wishes.

## 2023-10-17 NOTE — PROGRESS NOTE ADULT - ATTENDING COMMENTS
Overall is feeling better.  Afebrile.  Labs are noted.  Tolerating PO.  For cholecystectomy tomorrow.

## 2023-10-18 VITALS
RESPIRATION RATE: 17 BRPM | HEART RATE: 86 BPM | DIASTOLIC BLOOD PRESSURE: 74 MMHG | OXYGEN SATURATION: 96 % | SYSTOLIC BLOOD PRESSURE: 113 MMHG | TEMPERATURE: 98 F

## 2023-10-18 LAB
ALBUMIN SERPL ELPH-MCNC: 3.8 G/DL — SIGNIFICANT CHANGE UP (ref 3.3–5)
ALBUMIN SERPL ELPH-MCNC: 3.8 G/DL — SIGNIFICANT CHANGE UP (ref 3.3–5)
ALBUMIN SERPL ELPH-MCNC: 3.9 G/DL — SIGNIFICANT CHANGE UP (ref 3.3–5)
ALBUMIN SERPL ELPH-MCNC: 3.9 G/DL — SIGNIFICANT CHANGE UP (ref 3.3–5)
ALP SERPL-CCNC: 82 U/L — SIGNIFICANT CHANGE UP (ref 40–120)
ALP SERPL-CCNC: 82 U/L — SIGNIFICANT CHANGE UP (ref 40–120)
ALP SERPL-CCNC: 84 U/L — SIGNIFICANT CHANGE UP (ref 40–120)
ALP SERPL-CCNC: 84 U/L — SIGNIFICANT CHANGE UP (ref 40–120)
ALT FLD-CCNC: 47 U/L — HIGH (ref 10–45)
ALT FLD-CCNC: 47 U/L — HIGH (ref 10–45)
ALT FLD-CCNC: 58 U/L — HIGH (ref 10–45)
ALT FLD-CCNC: 58 U/L — HIGH (ref 10–45)
ANION GAP SERPL CALC-SCNC: 11 MMOL/L — SIGNIFICANT CHANGE UP (ref 5–17)
ANION GAP SERPL CALC-SCNC: 11 MMOL/L — SIGNIFICANT CHANGE UP (ref 5–17)
ANION GAP SERPL CALC-SCNC: 12 MMOL/L — SIGNIFICANT CHANGE UP (ref 5–17)
ANION GAP SERPL CALC-SCNC: 12 MMOL/L — SIGNIFICANT CHANGE UP (ref 5–17)
APTT BLD: 50.8 SEC — HIGH (ref 24.5–35.6)
APTT BLD: 50.8 SEC — HIGH (ref 24.5–35.6)
AST SERPL-CCNC: 28 U/L — SIGNIFICANT CHANGE UP (ref 10–40)
AST SERPL-CCNC: 28 U/L — SIGNIFICANT CHANGE UP (ref 10–40)
AST SERPL-CCNC: 49 U/L — HIGH (ref 10–40)
AST SERPL-CCNC: 49 U/L — HIGH (ref 10–40)
BILIRUB SERPL-MCNC: 0.3 MG/DL — SIGNIFICANT CHANGE UP (ref 0.2–1.2)
BLD GP AB SCN SERPL QL: NEGATIVE — SIGNIFICANT CHANGE UP
BLD GP AB SCN SERPL QL: NEGATIVE — SIGNIFICANT CHANGE UP
BUN SERPL-MCNC: 7 MG/DL — SIGNIFICANT CHANGE UP (ref 7–23)
BUN SERPL-MCNC: 7 MG/DL — SIGNIFICANT CHANGE UP (ref 7–23)
BUN SERPL-MCNC: 9 MG/DL — SIGNIFICANT CHANGE UP (ref 7–23)
BUN SERPL-MCNC: 9 MG/DL — SIGNIFICANT CHANGE UP (ref 7–23)
CALCIUM SERPL-MCNC: 9.3 MG/DL — SIGNIFICANT CHANGE UP (ref 8.4–10.5)
CALCIUM SERPL-MCNC: 9.3 MG/DL — SIGNIFICANT CHANGE UP (ref 8.4–10.5)
CALCIUM SERPL-MCNC: 9.4 MG/DL — SIGNIFICANT CHANGE UP (ref 8.4–10.5)
CALCIUM SERPL-MCNC: 9.4 MG/DL — SIGNIFICANT CHANGE UP (ref 8.4–10.5)
CHLORIDE SERPL-SCNC: 103 MMOL/L — SIGNIFICANT CHANGE UP (ref 96–108)
CHLORIDE SERPL-SCNC: 103 MMOL/L — SIGNIFICANT CHANGE UP (ref 96–108)
CHLORIDE SERPL-SCNC: 107 MMOL/L — SIGNIFICANT CHANGE UP (ref 96–108)
CHLORIDE SERPL-SCNC: 107 MMOL/L — SIGNIFICANT CHANGE UP (ref 96–108)
CO2 SERPL-SCNC: 22 MMOL/L — SIGNIFICANT CHANGE UP (ref 22–31)
CO2 SERPL-SCNC: 22 MMOL/L — SIGNIFICANT CHANGE UP (ref 22–31)
CO2 SERPL-SCNC: 23 MMOL/L — SIGNIFICANT CHANGE UP (ref 22–31)
CO2 SERPL-SCNC: 23 MMOL/L — SIGNIFICANT CHANGE UP (ref 22–31)
CREAT SERPL-MCNC: 0.56 MG/DL — SIGNIFICANT CHANGE UP (ref 0.5–1.3)
EGFR: 128 ML/MIN/1.73M2 — SIGNIFICANT CHANGE UP
GLUCOSE SERPL-MCNC: 112 MG/DL — HIGH (ref 70–99)
GLUCOSE SERPL-MCNC: 112 MG/DL — HIGH (ref 70–99)
GLUCOSE SERPL-MCNC: 94 MG/DL — SIGNIFICANT CHANGE UP (ref 70–99)
GLUCOSE SERPL-MCNC: 94 MG/DL — SIGNIFICANT CHANGE UP (ref 70–99)
HCG UR QL: NEGATIVE — SIGNIFICANT CHANGE UP
HCG UR QL: NEGATIVE — SIGNIFICANT CHANGE UP
HCT VFR BLD CALC: 38.7 % — SIGNIFICANT CHANGE UP (ref 34.5–45)
HCT VFR BLD CALC: 38.7 % — SIGNIFICANT CHANGE UP (ref 34.5–45)
HCT VFR BLD CALC: 39.8 % — SIGNIFICANT CHANGE UP (ref 34.5–45)
HCT VFR BLD CALC: 39.8 % — SIGNIFICANT CHANGE UP (ref 34.5–45)
HGB BLD-MCNC: 13 G/DL — SIGNIFICANT CHANGE UP (ref 11.5–15.5)
HGB BLD-MCNC: 13 G/DL — SIGNIFICANT CHANGE UP (ref 11.5–15.5)
HGB BLD-MCNC: 13.4 G/DL — SIGNIFICANT CHANGE UP (ref 11.5–15.5)
HGB BLD-MCNC: 13.4 G/DL — SIGNIFICANT CHANGE UP (ref 11.5–15.5)
INR BLD: 1.03 — SIGNIFICANT CHANGE UP (ref 0.85–1.18)
INR BLD: 1.03 — SIGNIFICANT CHANGE UP (ref 0.85–1.18)
MAGNESIUM SERPL-MCNC: 1.9 MG/DL — SIGNIFICANT CHANGE UP (ref 1.6–2.6)
MCHC RBC-ENTMCNC: 28.7 PG — SIGNIFICANT CHANGE UP (ref 27–34)
MCHC RBC-ENTMCNC: 28.7 PG — SIGNIFICANT CHANGE UP (ref 27–34)
MCHC RBC-ENTMCNC: 28.8 PG — SIGNIFICANT CHANGE UP (ref 27–34)
MCHC RBC-ENTMCNC: 28.8 PG — SIGNIFICANT CHANGE UP (ref 27–34)
MCHC RBC-ENTMCNC: 33.6 GM/DL — SIGNIFICANT CHANGE UP (ref 32–36)
MCHC RBC-ENTMCNC: 33.6 GM/DL — SIGNIFICANT CHANGE UP (ref 32–36)
MCHC RBC-ENTMCNC: 33.7 GM/DL — SIGNIFICANT CHANGE UP (ref 32–36)
MCHC RBC-ENTMCNC: 33.7 GM/DL — SIGNIFICANT CHANGE UP (ref 32–36)
MCV RBC AUTO: 85.2 FL — SIGNIFICANT CHANGE UP (ref 80–100)
MCV RBC AUTO: 85.2 FL — SIGNIFICANT CHANGE UP (ref 80–100)
MCV RBC AUTO: 85.8 FL — SIGNIFICANT CHANGE UP (ref 80–100)
MCV RBC AUTO: 85.8 FL — SIGNIFICANT CHANGE UP (ref 80–100)
NRBC # BLD: 0 /100 WBCS — SIGNIFICANT CHANGE UP (ref 0–0)
PHOSPHATE SERPL-MCNC: 4.2 MG/DL — SIGNIFICANT CHANGE UP (ref 2.5–4.5)
PHOSPHATE SERPL-MCNC: 4.2 MG/DL — SIGNIFICANT CHANGE UP (ref 2.5–4.5)
PHOSPHATE SERPL-MCNC: 5.1 MG/DL — HIGH (ref 2.5–4.5)
PHOSPHATE SERPL-MCNC: 5.1 MG/DL — HIGH (ref 2.5–4.5)
PLATELET # BLD AUTO: 181 K/UL — SIGNIFICANT CHANGE UP (ref 150–400)
PLATELET # BLD AUTO: 181 K/UL — SIGNIFICANT CHANGE UP (ref 150–400)
PLATELET # BLD AUTO: 195 K/UL — SIGNIFICANT CHANGE UP (ref 150–400)
PLATELET # BLD AUTO: 195 K/UL — SIGNIFICANT CHANGE UP (ref 150–400)
POTASSIUM SERPL-MCNC: 4 MMOL/L — SIGNIFICANT CHANGE UP (ref 3.5–5.3)
POTASSIUM SERPL-MCNC: 4 MMOL/L — SIGNIFICANT CHANGE UP (ref 3.5–5.3)
POTASSIUM SERPL-MCNC: 4.2 MMOL/L — SIGNIFICANT CHANGE UP (ref 3.5–5.3)
POTASSIUM SERPL-MCNC: 4.2 MMOL/L — SIGNIFICANT CHANGE UP (ref 3.5–5.3)
POTASSIUM SERPL-SCNC: 4 MMOL/L — SIGNIFICANT CHANGE UP (ref 3.5–5.3)
POTASSIUM SERPL-SCNC: 4 MMOL/L — SIGNIFICANT CHANGE UP (ref 3.5–5.3)
POTASSIUM SERPL-SCNC: 4.2 MMOL/L — SIGNIFICANT CHANGE UP (ref 3.5–5.3)
POTASSIUM SERPL-SCNC: 4.2 MMOL/L — SIGNIFICANT CHANGE UP (ref 3.5–5.3)
PROT SERPL-MCNC: 7.1 G/DL — SIGNIFICANT CHANGE UP (ref 6–8.3)
PROT SERPL-MCNC: 7.1 G/DL — SIGNIFICANT CHANGE UP (ref 6–8.3)
PROT SERPL-MCNC: 7.7 G/DL — SIGNIFICANT CHANGE UP (ref 6–8.3)
PROT SERPL-MCNC: 7.7 G/DL — SIGNIFICANT CHANGE UP (ref 6–8.3)
PROTHROM AB SERPL-ACNC: 11.7 SEC — SIGNIFICANT CHANGE UP (ref 9.5–13)
PROTHROM AB SERPL-ACNC: 11.7 SEC — SIGNIFICANT CHANGE UP (ref 9.5–13)
RBC # BLD: 4.51 M/UL — SIGNIFICANT CHANGE UP (ref 3.8–5.2)
RBC # BLD: 4.51 M/UL — SIGNIFICANT CHANGE UP (ref 3.8–5.2)
RBC # BLD: 4.67 M/UL — SIGNIFICANT CHANGE UP (ref 3.8–5.2)
RBC # BLD: 4.67 M/UL — SIGNIFICANT CHANGE UP (ref 3.8–5.2)
RBC # FLD: 13.3 % — SIGNIFICANT CHANGE UP (ref 10.3–14.5)
RH IG SCN BLD-IMP: POSITIVE — SIGNIFICANT CHANGE UP
RH IG SCN BLD-IMP: POSITIVE — SIGNIFICANT CHANGE UP
SODIUM SERPL-SCNC: 137 MMOL/L — SIGNIFICANT CHANGE UP (ref 135–145)
SODIUM SERPL-SCNC: 137 MMOL/L — SIGNIFICANT CHANGE UP (ref 135–145)
SODIUM SERPL-SCNC: 141 MMOL/L — SIGNIFICANT CHANGE UP (ref 135–145)
SODIUM SERPL-SCNC: 141 MMOL/L — SIGNIFICANT CHANGE UP (ref 135–145)
WBC # BLD: 12.21 K/UL — HIGH (ref 3.8–10.5)
WBC # BLD: 12.21 K/UL — HIGH (ref 3.8–10.5)
WBC # BLD: 5.33 K/UL — SIGNIFICANT CHANGE UP (ref 3.8–10.5)
WBC # BLD: 5.33 K/UL — SIGNIFICANT CHANGE UP (ref 3.8–10.5)
WBC # FLD AUTO: 12.21 K/UL — HIGH (ref 3.8–10.5)
WBC # FLD AUTO: 12.21 K/UL — HIGH (ref 3.8–10.5)
WBC # FLD AUTO: 5.33 K/UL — SIGNIFICANT CHANGE UP (ref 3.8–10.5)
WBC # FLD AUTO: 5.33 K/UL — SIGNIFICANT CHANGE UP (ref 3.8–10.5)

## 2023-10-18 PROCEDURE — 76705 ECHO EXAM OF ABDOMEN: CPT

## 2023-10-18 PROCEDURE — 85610 PROTHROMBIN TIME: CPT

## 2023-10-18 PROCEDURE — S2900: CPT

## 2023-10-18 PROCEDURE — 84702 CHORIONIC GONADOTROPIN TEST: CPT

## 2023-10-18 PROCEDURE — 85025 COMPLETE CBC W/AUTO DIFF WBC: CPT

## 2023-10-18 PROCEDURE — C1889: CPT

## 2023-10-18 PROCEDURE — 81025 URINE PREGNANCY TEST: CPT

## 2023-10-18 PROCEDURE — 86901 BLOOD TYPING SEROLOGIC RH(D): CPT

## 2023-10-18 PROCEDURE — 88304 TISSUE EXAM BY PATHOLOGIST: CPT

## 2023-10-18 PROCEDURE — 86850 RBC ANTIBODY SCREEN: CPT

## 2023-10-18 PROCEDURE — 86900 BLOOD TYPING SEROLOGIC ABO: CPT

## 2023-10-18 PROCEDURE — 84100 ASSAY OF PHOSPHORUS: CPT

## 2023-10-18 PROCEDURE — 36415 COLL VENOUS BLD VENIPUNCTURE: CPT

## 2023-10-18 PROCEDURE — 96374 THER/PROPH/DIAG INJ IV PUSH: CPT

## 2023-10-18 PROCEDURE — 88304 TISSUE EXAM BY PATHOLOGIST: CPT | Mod: 26

## 2023-10-18 PROCEDURE — 85730 THROMBOPLASTIN TIME PARTIAL: CPT

## 2023-10-18 PROCEDURE — 74181 MRI ABDOMEN W/O CONTRAST: CPT

## 2023-10-18 PROCEDURE — 85027 COMPLETE CBC AUTOMATED: CPT

## 2023-10-18 PROCEDURE — 47562 LAPAROSCOPIC CHOLECYSTECTOMY: CPT | Mod: AS

## 2023-10-18 PROCEDURE — 83735 ASSAY OF MAGNESIUM: CPT

## 2023-10-18 PROCEDURE — 99233 SBSQ HOSP IP/OBS HIGH 50: CPT

## 2023-10-18 PROCEDURE — 80053 COMPREHEN METABOLIC PANEL: CPT

## 2023-10-18 PROCEDURE — 83690 ASSAY OF LIPASE: CPT

## 2023-10-18 PROCEDURE — 93005 ELECTROCARDIOGRAM TRACING: CPT

## 2023-10-18 PROCEDURE — 99285 EMERGENCY DEPT VISIT HI MDM: CPT

## 2023-10-18 PROCEDURE — 96375 TX/PRO/DX INJ NEW DRUG ADDON: CPT

## 2023-10-18 DEVICE — LIGATING CLIPS WECK HEMOLOK POLYMER MEDIUM-LARGE (GREEN) 6: Type: IMPLANTABLE DEVICE | Status: FUNCTIONAL

## 2023-10-18 RX ORDER — OXYCODONE HYDROCHLORIDE 5 MG/1
1 TABLET ORAL
Qty: 10 | Refills: 0
Start: 2023-10-18

## 2023-10-18 RX ORDER — MAGNESIUM SULFATE 500 MG/ML
1 VIAL (ML) INJECTION ONCE
Refills: 0 | Status: COMPLETED | OUTPATIENT
Start: 2023-10-18 | End: 2023-10-18

## 2023-10-18 RX ORDER — ONDANSETRON 8 MG/1
4 TABLET, FILM COATED ORAL ONCE
Refills: 0 | Status: COMPLETED | OUTPATIENT
Start: 2023-10-18 | End: 2023-10-18

## 2023-10-18 RX ORDER — HYDROMORPHONE HYDROCHLORIDE 2 MG/ML
0.25 INJECTION INTRAMUSCULAR; INTRAVENOUS; SUBCUTANEOUS ONCE
Refills: 0 | Status: DISCONTINUED | OUTPATIENT
Start: 2023-10-18 | End: 2023-10-18

## 2023-10-18 RX ORDER — OXYCODONE HYDROCHLORIDE 5 MG/1
1 TABLET ORAL
Qty: 20 | Refills: 0
Start: 2023-10-18 | End: 2023-10-27

## 2023-10-18 RX ORDER — METOCLOPRAMIDE HCL 10 MG
5 TABLET ORAL ONCE
Refills: 0 | Status: COMPLETED | OUTPATIENT
Start: 2023-10-18 | End: 2023-10-18

## 2023-10-18 RX ORDER — DOCUSATE SODIUM 100 MG
1 CAPSULE ORAL
Qty: 30 | Refills: 0
Start: 2023-10-18 | End: 2023-10-27

## 2023-10-18 RX ADMIN — Medication 650 MILLIGRAM(S): at 20:50

## 2023-10-18 RX ADMIN — OXYCODONE HYDROCHLORIDE 5 MILLIGRAM(S): 5 TABLET ORAL at 18:10

## 2023-10-18 RX ADMIN — Medication 100 MILLIGRAM(S): at 06:07

## 2023-10-18 RX ADMIN — Medication 100 GRAM(S): at 10:34

## 2023-10-18 RX ADMIN — ONDANSETRON 4 MILLIGRAM(S): 8 TABLET, FILM COATED ORAL at 21:16

## 2023-10-18 RX ADMIN — ONDANSETRON 4 MILLIGRAM(S): 8 TABLET, FILM COATED ORAL at 17:45

## 2023-10-18 RX ADMIN — HYDROMORPHONE HYDROCHLORIDE 0.25 MILLIGRAM(S): 2 INJECTION INTRAMUSCULAR; INTRAVENOUS; SUBCUTANEOUS at 18:42

## 2023-10-18 RX ADMIN — OXYCODONE HYDROCHLORIDE 5 MILLIGRAM(S): 5 TABLET ORAL at 17:35

## 2023-10-18 RX ADMIN — HEPARIN SODIUM 5000 UNIT(S): 5000 INJECTION INTRAVENOUS; SUBCUTANEOUS at 06:07

## 2023-10-18 RX ADMIN — Medication 5 MILLIGRAM(S): at 18:24

## 2023-10-18 NOTE — PRE-ANESTHESIA EVALUATION ADULT - NSANTHOSAYNRD_GEN_A_CORE
No. DWAIN screening performed.  STOP BANG Legend: 0-2 = LOW Risk; 3-4 = INTERMEDIATE Risk; 5-8 = HIGH Risk

## 2023-10-18 NOTE — DISCHARGE NOTE PROVIDER - NSDCFUADDINST_GEN_ALL_CORE_FT
Please follow up with Dr. Javed in one week; you may call the office to make an appointment at your earliest convenience.       General Discharge Instructions:  Please resume all regular home medications unless specifically advised not to take a particular medication. Also, please take any new medications as prescribed.  Please get plenty of rest, continue to ambulate several times per day, and drink adequate amounts of fluids. Avoid lifting weights greater than 5-10 lbs until you follow-up with your surgeon, who will instruct you further regarding activity restrictions.  Avoid driving or operating heavy machinery while taking pain medications.  Please follow-up with your surgeon and Primary Care Provider (PCP) as advised.  Incision Care:  *Please call your doctor if you have increased pain, swelling, redness, or drainage from the incision site.  *Avoid swimming and baths until your follow-up appointment.  *You may shower, and wash surgical incisions with a mild soap and warm water. Gently pat the area dry.       Warning Signs:  Please call your doctor if you experience the following:  *You experience new chest pain, pressure, squeezing or tightness.  *New or worsening cough, shortness of breath, or wheeze.  *If you are vomiting and cannot keep down fluids or your medications.  *You are getting dehydrated due to continued vomiting, diarrhea, or other reasons. Signs of dehydration include dry mouth, rapid heartbeat, or feeling dizzy or faint when standing.  *You see blood or dark/black material when you vomit or have a bowel movement.  *You experience burning when you urinate, have blood in your urine, or experience a discharge.  *Your pain is not improving within 8-12 hours or is not gone within 24 hours. Call or return immediately if your pain is getting worse, changes location, or moves to your chest or back.  *You have shaking chills, or fever greater than 101.5 degrees Fahrenheit or 38 degrees Celsius.  *Any change in your symptoms, or any new symptoms that concern you.  Please follow up with Dr. Javed in one week; you may call the office to make an appointment at your earliest convenience.       General Discharge Instructions:  Please resume all regular home medications unless specifically advised not to take a particular medication. Also, please take any new medications as prescribed.  Please get plenty of rest, continue to ambulate several times per day, and drink adequate amounts of fluids. Avoid lifting weights greater than 5-10 lbs until you follow-up with your surgeon, who will instruct you further regarding activity restrictions.  Avoid driving or operating heavy machinery while taking pain medications.  Please follow-up with your surgeon and Primary Care Provider (PCP) as advised.  Incision Care:  *Please call your doctor if you have increased pain, swelling, redness, or drainage from the incision site.  *Avoid swimming and baths until your follow-up appointment.  *You may shower, and wash surgical incisions with a mild soap and warm water -- do not scrub. Gently pat the area dry.       Warning Signs:  Please call your doctor if you experience the following:  *You experience new chest pain, pressure, squeezing or tightness.  *New or worsening cough, shortness of breath, or wheeze.  *If you are vomiting and cannot keep down fluids or your medications.  *You are getting dehydrated due to continued vomiting, diarrhea, or other reasons. Signs of dehydration include dry mouth, rapid heartbeat, or feeling dizzy or faint when standing.  *You see blood or dark/black material when you vomit or have a bowel movement.  *You experience burning when you urinate, have blood in your urine, or experience a discharge.  *Your pain is not improving within 8-12 hours or is not gone within 24 hours. Call or return immediately if your pain is getting worse, changes location, or moves to your chest or back.  *You have shaking chills, or fever greater than 101.5 degrees Fahrenheit or 38 degrees Celsius.  *Any change in your symptoms, or any new symptoms that concern you.

## 2023-10-18 NOTE — DISCHARGE NOTE PROVIDER - CARE PROVIDER_API CALL
Duran Javed  Surgery  [address], [address]  [place], [place] [address]  Phone: [phone]  Fax: [phone]  Follow Up Time: 1 week

## 2023-10-18 NOTE — DISCHARGE NOTE NURSING/CASE MANAGEMENT/SOCIAL WORK - NSDCPEFALRISK_GEN_ALL_CORE
For information on Fall & Injury Prevention, visit: [email or web address] OR  [email or web address] OR  https://www.cdc.gov/steadi/patient.html

## 2023-10-18 NOTE — PROGRESS NOTE ADULT - ASSESSMENT
28F with no significant PMHx and PSHx of L arm surgery as a child who presents to [facility] from home for worsening RUQ abdominal pain. Patient afebrile and HD stable on presentation. Laboratory findings significant for leukocytosis with L shift and mild transaminitis with elvated lipase c/f pancreatitis. RUQ U/S significant for distended gallbladder with positive sonographic [place]'s sign. Given elevated lipase and gallstones on U/S, likely diagnosis gallstone pancreatitis.     NPO/IVF  Ceftriaxone/flagyl  Pain/nausea control PRN  HSQ/SCDs/IS/OOBA  AM labs  OR for lap lo
29 yo F with no significant PMHx and PSHx of L arm surgery as a child who presents to [facility] from home for  RUQ abdominal pain, pt was scheduled for elective procedure in November but presented to ed for  worsening RUQ abdominal pain, .  Pt admitted for gallstone pancreatitis and acute cholecystitis     gallstone pancreatitis  acute cholecystitis   transaminitis   RUQ  W Distended gallbladder containing multiple small stones and positive   sonographic [place] sign. Findings are suspicious for acute cholecystitis.  Lipase > 3k   wbc  >13k --> 6k  agree w IVF and CLD   lft'S downtrending AST//88 -32/55  plan for CCY on this admission after recovery of pancrease     dvt ppx hsq
28F with no significant PMHx and PSHx of L arm surgery as a child who presents to [facility] from home for worsening RUQ abdominal pain. Patient afebrile and HD stable on presentation. Laboratory findings significant for leukocytosis with L shift and mild transaminitis with elvated lipase c/f pancreatitis. RUQ U/S significant for distended gallbladder with positive sonographic [place]'s sign. Given elevated lipase and gallstones on U/S, likely diagnosis gallstone pancreatitis.     CLD/IVF @ 140, NPO at midnight   Ceftriaxone/flagyl  Pain/nausea control PRN  HSQ/SCDs/IS/OOBA  AM labs w/ LFTs  OR 10/18
29 yo F with no significant PMHx and PSHx of L arm surgery as a child who presents to [facility] from home for  RUQ abdominal pain, pt was scheduled for elective procedure in November but presented to ed for  worsening RUQ abdominal pain, .  Pt admitted for gallstone pancreatitis and acute cholecystitis     gallstone pancreatitis  acute cholecystitis   transaminitis   pre-op for internal medicine   RUQ  W Distended gallbladder containing multiple small stones and positive   sonographic [place] sign. Findings are suspicious for acute cholecystitis.  Lipase > 3k   wbc  >13k --> 6k  agree w IVF and CLD   RCRI class I , sales 0.0, PT is low risk for intermediate risk procedure   lft'S downtrending AST//88 -32/55  plan for CCY today     dvt ppx hsq
28F with no significant PMHx and PSHx of L arm surgery as a child who presents to [facility] from home for worsening RUQ abdominal pain. Patient afebrile and HD stable on presentation. Laboratory findings significant for leukocytosis with L shift and mild transaminitis with elvated lipase c/f pancreatitis. RUQ U/S significant for distended gallbladder with positive sonographic [place]'s sign. Given elevated lipase and gallstones on U/S, likely diagnosis gallstone pancreatitis.     CLD/IVF @ 140  No antbiotics  Pain/nausea control PRN  HSQ/SCDs/IS/OOBA  AM labs with Hepatic functional panel daily  Likely laparoscopic cholecystectomy this admisson

## 2023-10-18 NOTE — DISCHARGE NOTE PROVIDER - NSDCMRMEDTOKEN_GEN_ALL_CORE_FT
Colace 100 mg oral capsule: 1 cap(s) orally 3 times a day as needed for with oxycodone to avoid constipation  oxyCODONE 5 mg oral tablet: 1 tab(s) orally 3 times a day as needed for  severe pain MDD: 3 tabs   Colace 100 mg oral capsule: 1 cap(s) orally 3 times a day as needed for with oxycodone to avoid constipation  Oxaydo 5 mg oral tablet: 1 tab(s) orally every 6 hours as needed for  moderate pain MDD: 4   Colace 100 mg oral capsule: 1 cap(s) orally 3 times a day as needed for with oxycodone to avoid constipation  oxyCODONE 5 mg oral tablet: 1 tab(s) orally every 6 hours MDD: 4   Colace 100 mg oral capsule: 1 cap(s) orally 3 times a day as needed for with oxycodone to avoid constipation  oxyCODONE 5 mg oral tablet: 1 tab(s) orally every 6 hours as needed for  severe pain MDD: 4

## 2023-10-18 NOTE — DISCHARGE NOTE PROVIDER - HOSPITAL COURSE
Ms. Charles is a 28F with no significant PMHx and PSHx of L arm surgery as a child, who presented to [facility] from home for worsening RUQ abdominal pain. Patient was afebrile and HD stable on presentation. Her laboratory findings were significant for leukocytosis with L shift and mild transaminitis with elvated lipase concerning for pancreatitis. RUQ U/S was significant for distended gallbladder with positive sonographic [place]'s sign. Given elevated lipase and gallstones on U/S, patient's presentation was most consistent with gallstone pancreatitis. During her hospital stay, laparoscopic cholecystectomy was planned once her epigastric pain and pancreatitis resolved, which occurred on day 3. Her procedure was uncomplicated. Her postoperative course was unremarkable with advancement of diet, passing trial of void, and pain control. On day of discharge patient was stable to be d/c'd home. Ms. Charles is a 28F with no significant PMHx and PSHx of L arm surgery as a child, who presented to [facility] from home for worsening RUQ abdominal pain. She was afebrile and HD stable on presentation. Her laboratory findings were significant for leukocytosis with L shift and mild transaminitis with elevated lipase concerning for pancreatitis. RUQ U/S was significant for distended gallbladder with positive sonographic [place]'s sign. Given elevated lipase and gallstones on U/S, patient's presentation was most consistent with gallstone pancreatitis. During her hospital stay, laparoscopic cholecystectomy was planned once her epigastric pain and pancreatitis resolved, which occurred on day 3. Her procedure was uncomplicated. Her postoperative course was unremarkable with advancement of diet, passing trial of void, and pain control. On day of discharge patient was stable to be d/c'd home.

## 2023-10-18 NOTE — DISCHARGE NOTE NURSING/CASE MANAGEMENT/SOCIAL WORK - PATIENT PORTAL LINK FT
You can access the FollowMyHealth Patient Portal offered by [facility] by registering at the following website: [email or web address]. By joining [facility]’s FollowMyHealth portal, you will also be able to view your health information using other applications (apps) compatible with our system.

## 2023-10-18 NOTE — BRIEF OPERATIVE NOTE - OPERATION/FINDINGS
Abdomen entered via supraumbilical cutdown. Additional ports placed under laparoscopic visualization. Robot docked. Critical view of safety obtained. Cystic duct and artery clipped x3 and transected. Gallbladder excised from fossa using hook cautery and removed using EndoCatch. Fascia closed with Maxon figure of eight. Skin closed with 4-0 Monocryl and Dermabond.

## 2023-10-18 NOTE — PROGRESS NOTE ADULT - SUBJECTIVE AND OBJECTIVE BOX
Patient is a 28y old  Female who presents with a chief complaint of abdominal pain (18 Oct 2023 07:07)      INTERVAL HPI/OVERNIGHT EVENTS: offers no new complaints; current symptoms resolving    MEDICATIONS  (STANDING):  cefTRIAXone   IVPB 1000 milliGRAM(s) IV Intermittent every 24 hours  heparin   Injectable 5000 Unit(s) SubCutaneous every 8 hours  influenza   Vaccine 0.5 milliLiter(s) IntraMuscular once  lactated ringers. 1000 milliLiter(s) (100 mL/Hr) IV Continuous <Continuous>  metroNIDAZOLE  IVPB 500 milliGRAM(s) IV Intermittent every 8 hours  metroNIDAZOLE  IVPB        MEDICATIONS  (PRN):  acetaminophen   Oral Liquid .. 650 milliGRAM(s) Oral every 6 hours PRN Mild Pain (1 - 3), Moderate Pain (4 - 6)  ondansetron Injectable 4 milliGRAM(s) IV Push every 6 hours PRN Nausea  oxyCODONE    IR 5 milliGRAM(s) Oral every 6 hours PRN Severe Pain (7 - 10)      __________________________________________________  REVIEW OF SYSTEMS:    CONSTITUTIONAL: No fever,   EYES: no acute visual disturbances  NECK: No pain or stiffness  RESPIRATORY: No cough; No shortness of breath  CARDIOVASCULAR: No chest pain, no palpitations  GASTROINTESTINAL: No pain. No nausea or vomiting; No diarrhea   NEUROLOGICAL: No headache or numbness, no tremors  MUSCULOSKELETAL: No joint pain, no muscle pain  GENITOURINARY: no dysuria, no frequency, no hesitancy  PSYCHIATRY: no depression , no anxiety  ALL OTHER  ROS negative        Vital Signs Last 24 Hrs  T(C): 36.8 (18 Oct 2023 13:02), Max: 37.1 (17 Oct 2023 17:00)  T(F): 98.3 (18 Oct 2023 13:02), Max: 98.7 (17 Oct 2023 17:00)  HR: 76 (18 Oct 2023 13:02) (72 - 86)  BP: 124/87 (18 Oct 2023 13:02) (112/75 - 131/81)  BP(mean): --  RR: 17 (18 Oct 2023 13:02) (17 - 19)  SpO2: 96% (18 Oct 2023 13:02) (94% - 99%)    Parameters below as of 18 Oct 2023 13:02  Patient On (Oxygen Delivery Method): room air        ________________________________________________  PHYSICAL EXAM:  GENERAL: NAD  HEENT: Normocephalic;  conjunctivae and sclerae clear; moist mucous membranes;   NECK : supple  CHEST/LUNG: Clear to auscultation bilaterally with good air entry   HEART: S1 S2  regular; no murmurs, gallops or rubs  ABDOMEN: Soft, Nontender, Nondistended; Bowel sounds present  EXTREMITIES: no cyanosis; no edema; no calf tenderness  SKIN: warm and dry; no rash  NERVOUS SYSTEM:  Awake and alert; Oriented  to place, person and time ; no new deficits    _________________________________________________  LABS:                        13.0   5.33  )-----------( 181      ( 18 Oct 2023 08:52 )             38.7     10-18    141  |  107  |  9   ----------------------------<  94  4.2   |  23  |  0.56    Ca    9.4      18 Oct 2023 08:52  Phos  5.1     10-18  Mg     1.9     10-18    TPro  7.1  /  Alb  3.8  /  TBili  0.3  /  DBili  x   /  AST  28  /  ALT  47<H>  /  AlkPhos  84  10-18    PT/INR - ( 18 Oct 2023 08:52 )   PT: 11.7 sec;   INR: 1.03          PTT - ( 18 Oct 2023 08:52 )  PTT:50.8 sec  Urinalysis Basic - ( 18 Oct 2023 08:52 )    Color: x / Appearance: x / SG: x / pH: x  Gluc: 94 mg/dL / Ketone: x  / Bili: x / Urobili: x   Blood: x / Protein: x / Nitrite: x   Leuk Esterase: x / RBC: x / WBC x   Sq Epi: x / Non Sq Epi: x / Bacteria: x      CAPILLARY BLOOD GLUCOSE            RADIOLOGY & ADDITIONAL TESTS:      Plan of care was discussed with patient and /or primary care giver; all questions and concerns were addressed and care was aligned with patient's wishes.

## 2023-10-18 NOTE — PROGRESS NOTE ADULT - SUBJECTIVE AND OBJECTIVE BOX
SUBJECTIVE: Feeling well, pain improving overall, no N/V. Patient seen and examined bedside by chief resident.    cefTRIAXone   IVPB 1000 milliGRAM(s) IV Intermittent every 24 hours  heparin   Injectable 5000 Unit(s) SubCutaneous every 8 hours  metroNIDAZOLE  IVPB 500 milliGRAM(s) IV Intermittent every 8 hours  metroNIDAZOLE  IVPB        MEDICATIONS  (PRN):  acetaminophen   Oral Liquid .. 650 milliGRAM(s) Oral every 6 hours PRN Mild Pain (1 - 3), Moderate Pain (4 - 6)  ondansetron Injectable 4 milliGRAM(s) IV Push every 6 hours PRN Nausea  oxyCODONE    IR 5 milliGRAM(s) Oral every 6 hours PRN Severe Pain (7 - 10)      I&O's Detail    17 Oct 2023 07:01  -  18 Oct 2023 07:00  --------------------------------------------------------  IN:    IV PiggyBack: 50 mL    IV PiggyBack: 300 mL    Lactated Ringers: 2150 mL    Oral Fluid: 860 mL  Total IN: 3360 mL    OUT:    Voided (mL): 3050 mL  Total OUT: 3050 mL    Total NET: 310 mL          Vital Signs Last 24 Hrs  T(C): 36.9 (18 Oct 2023 04:59), Max: 37.2 (17 Oct 2023 13:56)  T(F): 98.5 (18 Oct 2023 04:59), Max: 99 (17 Oct 2023 13:56)  HR: 86 (18 Oct 2023 04:59) (69 - 86)  BP: 117/83 (18 Oct 2023 04:59) (117/81 - 131/81)  BP(mean): --  RR: 18 (18 Oct 2023 04:59) (17 - 18)  SpO2: 95% (18 Oct 2023 04:59) (94% - 99%)    Parameters below as of 18 Oct 2023 04:59  Patient On (Oxygen Delivery Method): room air        General: NAD, resting comfortably in bed  C/V: NSR  Pulm: Nonlabored breathing, no respiratory distress  Abd: soft, NT/ND  Extrem: SCDs in place    LABS:                        13.7   6.47  )-----------( 178      ( 17 Oct 2023 05:30 )             40.4     10-17    135  |  101  |  5<L>  ----------------------------<  85  3.9   |  26  |  0.53    Ca    9.6      17 Oct 2023 05:30  Phos  5.2     10-17  Mg     2.0     10-17    TPro  7.4  /  Alb  4.0  /  TBili  0.4  /  DBili  x   /  AST  32  /  ALT  55<H>  /  AlkPhos  83  10-17      Urinalysis Basic - ( 17 Oct 2023 05:30 )    Color: x / Appearance: x / SG: x / pH: x  Gluc: 85 mg/dL / Ketone: x  / Bili: x / Urobili: x   Blood: x / Protein: x / Nitrite: x   Leuk Esterase: x / RBC: x / WBC x   Sq Epi: x / Non Sq Epi: x / Bacteria: x

## 2023-10-20 RX ORDER — TRAMADOL HYDROCHLORIDE 50 MG/1
1 TABLET ORAL
Qty: 20 | Refills: 0
Start: 2023-10-20 | End: 2023-10-24

## 2023-10-25 DIAGNOSIS — K81.0 ACUTE CHOLECYSTITIS: ICD-10-CM

## 2023-10-25 DIAGNOSIS — K85.10 BILIARY ACUTE PANCREATITIS WITHOUT NECROSIS OR INFECTION: ICD-10-CM

## 2023-10-25 LAB
SURGICAL PATHOLOGY STUDY: SIGNIFICANT CHANGE UP
SURGICAL PATHOLOGY STUDY: SIGNIFICANT CHANGE UP

## 2024-03-18 NOTE — ED PROVIDER NOTE - SEVERITY
Health Maintenance Due   Topic Date Due    COVID-19 Vaccine (1) Never done    Hepatitis A Vaccine (1 of 2 - 2-dose series) Never done    Influenza Vaccine (1) Never done    Annual Physical (ages 3-18)  09/09/2023       Patient is due for topics as listed above but is not proceeding with Immunization(s) COVID-19, Hep A, and Influenza at this time. Education provided for Immunization(s) COVID-19, Hep A, and Influenza.       MILD

## 2024-05-06 NOTE — H&P ADULT - HISTORY OF PRESENT ILLNESS
Patient is a 28F with no significant PMHx and PSHx of L arm surgery as a child who presents to [facility] from home for worsening RUQ abdominal pain. Patient reports she has seen Dr. Javed in the past for evaluation for RUQ pain. States she first developed cholelithiasis during pregnancy over the summer where she started to develop biliary colic symptoms of post prandial RUQ pain that was relieved after an hour or so. States episodes at that time were infrequent, but over the past 2 weeks she has been having 1-2 episodes per week of sharp, post prandial RUQ pain that radiates to the back. States the pain is 10/10 at its worst and not relieved by Tylenol. States she developed another attack this AM after a breakfast of an egg sandwich and ice cream at which point she developed sharp, stabby RUQ pain. States pain was associated with nausea and 1 episode of nonbilious, nonbloody emesis. Deneis fevers or chills. States she hasn't been able to tolerate PO today secondary to pain, but is hungry currently.    Denies family history of cholelithiasis or malginancy of galbladder, biliary tree or pancreas.    Medical History: Cholelithiasis  Surgical History: R arm surgery  Medications: Denies  Allergies: NKDA  Social History: Denies tobacco use. Reports she uses alcohol socially. Denies other drug use. States she works as a .    In the ED, patient afebrile:   - VITALS: T 98F, HR 77, /90, saturating well on RA  - LABORATORY: WBC 13K with neutrophil predominence, Lipase >300, mild transaminitis with  and T bili 0.5  - IMAGING: RUQ U/S performed in the ED that revealed distended gallbladder with multiple stones. No PCCF or GBW, but positive sonographic [place]'s sign concerning for acute cholecystitis. CBD mildly dilated at 7 mm
2 = A lot of assistance

## (undated) DEVICE — TROCAR APPLIED MEDICAL KII BALLOON BLUNT TIP 12MM X 100MM

## (undated) DEVICE — SUT MAXON 0 30" HGU-46

## (undated) DEVICE — XI ENDOWRIST SUCTION IRRIGATOR 8MM

## (undated) DEVICE — CLIPPER BLADE GENERAL USE

## (undated) DEVICE — INSUFFLATION NDL COVIDIEN SURGINEEDLE VERESS 120MM

## (undated) DEVICE — D HELP - CLEARVIEW CLEARIFY SYSTEM

## (undated) DEVICE — PREP CHLORAPREP HI-LITE ORANGE 26ML

## (undated) DEVICE — XI VESSEL SEALER

## (undated) DEVICE — SUT MONOCRYL 4-0 27" PS-2 UNDYED

## (undated) DEVICE — XI OBTURATOR OPTICAL BLADELESS 8MM

## (undated) DEVICE — SUT MAXON 0 30" GS-11

## (undated) DEVICE — FOLEY TRAY 16FR 5CC LF UMETER CLOSED

## (undated) DEVICE — XI DRAPE ARM

## (undated) DEVICE — XI DRAPE COLUMN

## (undated) DEVICE — BAG ETHICON SPECIMEN RETRIEVAL 4 X 6"

## (undated) DEVICE — DRSG GAUZE PACKTNER ROLL

## (undated) DEVICE — SUT VICRYL 0 27" UR-6

## (undated) DEVICE — DRAPE TOP SHEET 53" X 101"

## (undated) DEVICE — PACK GENERAL LAPAROSCOPY

## (undated) DEVICE — XI TIP COVER

## (undated) DEVICE — TUBING STRYKER PNEUMOCLEAR HIGH FLOW

## (undated) DEVICE — TROCAR COVIDIEN VERSAPORT BLADELESS OPTICAL 5MM STANDARD